# Patient Record
Sex: FEMALE | Race: WHITE | NOT HISPANIC OR LATINO | Employment: FULL TIME | URBAN - METROPOLITAN AREA
[De-identification: names, ages, dates, MRNs, and addresses within clinical notes are randomized per-mention and may not be internally consistent; named-entity substitution may affect disease eponyms.]

---

## 2017-05-09 ENCOUNTER — ALLSCRIPTS OFFICE VISIT (OUTPATIENT)
Dept: OTHER | Facility: OTHER | Age: 52
End: 2017-05-09

## 2017-05-09 DIAGNOSIS — N95.1 FEMALE CLIMACTERIC STATE: ICD-10-CM

## 2017-11-17 ENCOUNTER — GENERIC CONVERSION - ENCOUNTER (OUTPATIENT)
Dept: OTHER | Facility: OTHER | Age: 52
End: 2017-11-17

## 2018-01-12 VITALS
WEIGHT: 116.25 LBS | SYSTOLIC BLOOD PRESSURE: 118 MMHG | HEIGHT: 59 IN | BODY MASS INDEX: 23.44 KG/M2 | DIASTOLIC BLOOD PRESSURE: 72 MMHG

## 2018-01-15 NOTE — MISCELLANEOUS
Message   Date: 17 Nov 2017 10:45 AM EST, Recorded By: Jocy Mckeon For: Saurabh Schneider   Caller: Florence Randolph, Self   Phone: [phone] (Home), [phone] (Work)   Reason: Renew Medication   Patient called for refill of Prempro sent to [facility] is out of it  Escript sent for 90 day supply  Active Problems    1  Breast disorder (611 9) (N64 9)   2  Denied: History of Breast self examination education, encounter for   3  Cervical cancer screening (V76 2) (Z12 4)   4  Dense breasts (793 82) (R92 2)   5  Encounter for routine gynecological examination (V72 31) (Z01 419)   6  Encounter for screening mammogram for breast cancer (V76 12) (Z12 31)   7  Flu vaccine need (V04 81) (Z23)   8  Screening for HPV (human papillomavirus) (V73 81) (Z11 51)   9  Symptomatic menopausal or female climacteric states (627 2) (N95 1)    Current Meds   1  Prempro 0 3-1 5 MG Oral Tablet; Take 1 tablet daily; Therapy: [id number] to (Last [id number])  Requested for: [id number] Ordered    Allergies    1  No Known Drug Allergies    Plan  Symptomatic menopausal or female climacteric states    · Prempro 0 3-1 5 MG Oral Tablet;  Take 1 tablet daily    Signatures   Electronically signed by : Shukri Storm, ; Nov 17 2017 10:49AM EST                       (Author)

## 2018-01-17 NOTE — MISCELLANEOUS
Message   Date: 30 Nov 2016 8:50 AM EST, Recorded By: Beck Myles For: Fanta Mckeonstan: Kathy Simpson, Self   Phone: [phone] (Home), [phone] (Work)   Reason: Medical Complaint   pt has been having pelvic pain for 2 weeks    pt will come in for an U/S and appt with DARIA Perez Active Problems    1  Breast disorder (611 9) (N64 9)   2  Denied: History of Breast self examination education, encounter for   3  Cervical cancer screening (V76 2) (Z12 4)   4  Dense breasts (793 82) (R92 2)   5  Encounter for routine gynecological examination (V72 31) (Z01 419)   6  Screening for HPV (human papillomavirus) (V73 81) (Z11 51)   7  Symptomatic menopausal or female climacteric states (627 2) (N95 1)    Current Meds   1  Prempro 0 3-1 5 MG Oral Tablet; One po daily; Therapy: [id number] to (Last Rx:08Jun2016)  Requested for: [id number] Ordered    Allergies    1   No Known Drug Allergies    Signatures   Electronically signed by : Ward Adame, ; Nov 30 2016  8:51AM EST                       (Author)

## 2018-06-13 ENCOUNTER — ANNUAL EXAM (OUTPATIENT)
Dept: OBGYN CLINIC | Facility: CLINIC | Age: 53
End: 2018-06-13
Payer: COMMERCIAL

## 2018-06-13 VITALS
WEIGHT: 119.4 LBS | BODY MASS INDEX: 23.44 KG/M2 | HEIGHT: 60 IN | SYSTOLIC BLOOD PRESSURE: 110 MMHG | DIASTOLIC BLOOD PRESSURE: 60 MMHG

## 2018-06-13 DIAGNOSIS — Z01.419 ENCOUNTER FOR GYNECOLOGICAL EXAMINATION WITHOUT ABNORMAL FINDING: Primary | ICD-10-CM

## 2018-06-13 DIAGNOSIS — Z12.31 ENCOUNTER FOR SCREENING MAMMOGRAM FOR MALIGNANT NEOPLASM OF BREAST: ICD-10-CM

## 2018-06-13 DIAGNOSIS — N95.1 SYMPTOMATIC MENOPAUSAL OR FEMALE CLIMACTERIC STATES: ICD-10-CM

## 2018-06-13 PROCEDURE — 99396 PREV VISIT EST AGE 40-64: CPT | Performed by: OBSTETRICS & GYNECOLOGY

## 2018-06-13 RX ORDER — TRIAMCINOLONE ACETONIDE 1 MG/ML
LOTION TOPICAL
Refills: 0 | COMMUNITY
Start: 2018-04-19

## 2018-08-23 DIAGNOSIS — N95.1 SYMPTOMATIC MENOPAUSAL OR FEMALE CLIMACTERIC STATES: ICD-10-CM

## 2018-08-24 RX ORDER — ESTROGEN,CON/M-PROGEST ACET 0.3-1.5MG
TABLET ORAL
Qty: 84 TABLET | Refills: 1 | Status: SHIPPED | OUTPATIENT
Start: 2018-08-24 | End: 2019-03-06 | Stop reason: SDUPTHER

## 2019-03-06 DIAGNOSIS — N95.1 SYMPTOMATIC MENOPAUSAL OR FEMALE CLIMACTERIC STATES: ICD-10-CM

## 2019-03-07 RX ORDER — ESTROGEN,CON/M-PROGEST ACET 0.3-1.5MG
TABLET ORAL
Qty: 84 TABLET | Refills: 0 | Status: SHIPPED | OUTPATIENT
Start: 2019-03-07 | End: 2019-07-24 | Stop reason: SDUPTHER

## 2019-04-20 DIAGNOSIS — Z12.31 ENCOUNTER FOR SCREENING MAMMOGRAM FOR MALIGNANT NEOPLASM OF BREAST: ICD-10-CM

## 2019-06-12 ENCOUNTER — TELEPHONE (OUTPATIENT)
Dept: OBGYN CLINIC | Facility: CLINIC | Age: 54
End: 2019-06-12

## 2019-06-25 ENCOUNTER — ANNUAL EXAM (OUTPATIENT)
Dept: OBGYN CLINIC | Facility: CLINIC | Age: 54
End: 2019-06-25
Payer: COMMERCIAL

## 2019-06-25 VITALS
HEIGHT: 57 IN | DIASTOLIC BLOOD PRESSURE: 62 MMHG | WEIGHT: 119.8 LBS | BODY MASS INDEX: 25.85 KG/M2 | SYSTOLIC BLOOD PRESSURE: 138 MMHG

## 2019-06-25 DIAGNOSIS — Z12.4 CERVICAL CANCER SCREENING: Primary | ICD-10-CM

## 2019-06-25 DIAGNOSIS — Z01.419 ENCOUNTER FOR GYNECOLOGICAL EXAMINATION WITHOUT ABNORMAL FINDING: ICD-10-CM

## 2019-06-25 DIAGNOSIS — Z11.51 SCREENING FOR HPV (HUMAN PAPILLOMAVIRUS): ICD-10-CM

## 2019-06-25 DIAGNOSIS — Z12.31 ENCOUNTER FOR SCREENING MAMMOGRAM FOR MALIGNANT NEOPLASM OF BREAST: ICD-10-CM

## 2019-06-25 PROCEDURE — 99396 PREV VISIT EST AGE 40-64: CPT | Performed by: OBSTETRICS & GYNECOLOGY

## 2019-06-27 LAB
CLINICAL INFO: NORMAL
CYTO CVX: NORMAL
CYTOLOGY CMNT CVX/VAG CYTO-IMP: NORMAL
DATE PREVIOUS BX: NORMAL
HPV E6+E7 MRNA CVX QL NAA+PROBE: NOT DETECTED
LMP START DATE: NORMAL
SL AMB PREV. PAP:: NORMAL
SPECIMEN SOURCE CVX/VAG CYTO: NORMAL

## 2019-07-24 DIAGNOSIS — N95.1 SYMPTOMATIC MENOPAUSAL OR FEMALE CLIMACTERIC STATES: ICD-10-CM

## 2019-07-25 DIAGNOSIS — N95.1 SYMPTOMATIC MENOPAUSAL OR FEMALE CLIMACTERIC STATES: ICD-10-CM

## 2019-07-25 RX ORDER — ESTROGEN,CON/M-PROGEST ACET 0.3-1.5MG
TABLET ORAL
Qty: 84 TABLET | Refills: 0 | Status: SHIPPED | OUTPATIENT
Start: 2019-07-25 | End: 2019-07-25 | Stop reason: SDUPTHER

## 2019-07-26 RX ORDER — ESTROGEN,CON/M-PROGEST ACET 0.3-1.5MG
TABLET ORAL
Qty: 84 TABLET | Refills: 0 | Status: SHIPPED | OUTPATIENT
Start: 2019-07-26 | End: 2020-07-07

## 2020-05-05 DIAGNOSIS — N95.1 SYMPTOMATIC MENOPAUSAL OR FEMALE CLIMACTERIC STATES: ICD-10-CM

## 2020-05-08 DIAGNOSIS — N95.1 SYMPTOMATIC MENOPAUSAL OR FEMALE CLIMACTERIC STATES: ICD-10-CM

## 2020-05-14 DIAGNOSIS — N95.1 SYMPTOMATIC MENOPAUSAL OR FEMALE CLIMACTERIC STATES: ICD-10-CM

## 2020-06-11 RX ORDER — ESTROGEN,CON/M-PROGEST ACET 0.3-1.5MG
TABLET ORAL
Qty: 84 TABLET | Refills: 0 | OUTPATIENT
Start: 2020-06-11

## 2020-06-15 DIAGNOSIS — N95.1 SYMPTOMATIC MENOPAUSAL OR FEMALE CLIMACTERIC STATES: ICD-10-CM

## 2020-07-07 ENCOUNTER — ANNUAL EXAM (OUTPATIENT)
Dept: OBGYN CLINIC | Facility: CLINIC | Age: 55
End: 2020-07-07
Payer: COMMERCIAL

## 2020-07-07 VITALS
BODY MASS INDEX: 25.94 KG/M2 | HEIGHT: 57 IN | DIASTOLIC BLOOD PRESSURE: 86 MMHG | SYSTOLIC BLOOD PRESSURE: 156 MMHG | WEIGHT: 120.25 LBS

## 2020-07-07 DIAGNOSIS — Z12.31 ENCOUNTER FOR SCREENING MAMMOGRAM FOR MALIGNANT NEOPLASM OF BREAST: ICD-10-CM

## 2020-07-07 DIAGNOSIS — Z01.419 ENCOUNTER FOR GYNECOLOGICAL EXAMINATION WITHOUT ABNORMAL FINDING: Primary | ICD-10-CM

## 2020-07-07 DIAGNOSIS — N95.1 SYMPTOMATIC MENOPAUSAL OR FEMALE CLIMACTERIC STATES: ICD-10-CM

## 2020-07-07 PROCEDURE — 99396 PREV VISIT EST AGE 40-64: CPT | Performed by: OBSTETRICS & GYNECOLOGY

## 2020-07-07 NOTE — PROGRESS NOTES
CC:  Annual exam    HPI: Amanda Garcia presents for routine gyn exam   The lease is doing well with no complaints or concerns  She did need to restart the Prempro 0 3/1 5 mg for heat flashes  She did try to taper off but sound that this was unsatisfactory to her  She has been on this for several years and we did discuss the pros and cons of this at length  I will have her restart but urged her to try to take the medication every other day  Past Medical History:  Past Medical History:   Diagnosis Date    Abnormal Pap smear of cervix     HPV (human papilloma virus) infection     Shingles     last assessed 10/27/14       Past Surgical History:  Past Surgical History:   Procedure Laterality Date    COLPOSCOPY W/ BIOPSY / CURETTAGE  11/22/1995    COLPOSCOPY W/ BIOPSY / CURETTAGE  03/07/2003    SALPINGOOPHORECTOMY Right        Past OB/Gyn History:    Patient is menopausal   Denies any history of sexually transmitted infection  No history of abnormal pap smears   Her last pap smear was last urine was normal     ALLERGIES: No Known Allergies    MEDS:   Current Outpatient Medications:     estrogen, conjugated,-medroxyprogesterone (Prempro) 0 3-1 5 MG per tablet    triamcinolone (KENALOG) 0 1 % lotion    Family History:  Family History   Problem Relation Age of Onset    Diabetes Mother     Hyperlipidemia Mother     Diabetes Father     Hyperlipidemia Father        Social History:  Social History     Socioeconomic History    Marital status: /Civil Union     Spouse name: Not on file    Number of children: 2    Years of education: Not on file    Highest education level: Not on file   Occupational History    Not on file   Social Needs    Financial resource strain: Not on file    Food insecurity:     Worry: Not on file     Inability: Not on file    Transportation needs:     Medical: Not on file     Non-medical: Not on file   Tobacco Use    Smoking status: Never Smoker    Smokeless tobacco: Never Used   Substance and Sexual Activity    Alcohol use: No    Drug use: No    Sexual activity: Not Currently     Partners: Male     Birth control/protection: Post-menopausal   Lifestyle    Physical activity:     Days per week: Not on file     Minutes per session: Not on file    Stress: Not on file   Relationships    Social connections:     Talks on phone: Not on file     Gets together: Not on file     Attends Rastafari service: Not on file     Active member of club or organization: Not on file     Attends meetings of clubs or organizations: Not on file     Relationship status: Not on file    Intimate partner violence:     Fear of current or ex partner: Not on file     Emotionally abused: Not on file     Physically abused: Not on file     Forced sexual activity: Not on file   Other Topics Concern    Not on file   Social History Narrative    Caffeine use    Spiritism affiliation Latter day    Uses safety equipment: seatbelts             Review of Systems:  Gen:   Denies fatigue, chills, nausea, vomiting, fever  Skin: No rashes or discolorations of any concern  RESP: Denies SOB, no cough  CV: Denies chest pain or palpitations  Breasts: Denies masses, pain, skin changes and nipple discharge  GI: Denies abdominal pain, heartburn, nausea, vomiting, changes in bowel habits  : Denies dysuria, frequency, CVA tenderness, incontinence and hematuria  Genitalia: Denies abnormal vaginal discharge, external lesions, rashes, pelvic pain, pressure, abnormal bleeding  Rectal:  Denies pain, bleeding, hemorrhoids,    Physical Exam:  /86 (BP Location: Right arm, Patient Position: Sitting, Cuff Size: Standard)   Ht 4' 8 5" (1 435 m)   Wt 54 5 kg (120 lb 4 oz)   BMI 26 48 kg/m²    Gen: The patient was alert and oriented x3, pleasant well-appearing female in no acute distress  Neck:  Unremarkable, no lymphadenopathy, no thyromegaly, or tenderness    CV:  RRR, no murmurs  Resp:  Clear to auscultation bilaterally, no wheezing  Breasts: Symmetric  No dominant, discrete, fixed  or suspicious masses are noted  No skin or nipple changes  No palpable axillary nodes  No supraclavicular adenopathy  Abd:  Soft, nontender, nondistended, no masses or organomegaly  Back:  No CVA tenderness, no tenderness to palpation along spine  Pelvic:  Normal appearing external female genitalia, no visible lesions, no rashes  Vagina is free of discharge, normal vaginal epithelium, no abnormal  lesions, no evidence of prolapse anteriorly or posteriorly  Normal appearing cervix, mobile and nontender  A thin prep pap smear was not obtained  Uterus is normal size, mobile and, nontender  No palpable adnexal masses or tenderness  No anoperineal lesions  Rectal:  No masses, tenderness, hemorrhoids, or obvious blood  Skin:  No concerning lesions  Extremeties: No edema      Assessment & Plan:   1  Routine annual exam      RTO one year orPRN  2  Encounter for screening mammogram, referral for mammogram given to patient  3   Menopausal symptoms  Patient to use low-dose Prempro, 0 3/1 5, on an every-other-day basis

## 2020-09-17 DIAGNOSIS — Z12.31 ENCOUNTER FOR SCREENING MAMMOGRAM FOR MALIGNANT NEOPLASM OF BREAST: ICD-10-CM

## 2020-09-18 DIAGNOSIS — R92.8 MAMMOGRAM ABNORMAL: Primary | ICD-10-CM

## 2020-09-25 DIAGNOSIS — R92.8 MAMMOGRAM ABNORMAL: Primary | ICD-10-CM

## 2021-04-14 DIAGNOSIS — R92.8 MAMMOGRAM ABNORMAL: ICD-10-CM

## 2021-04-15 DIAGNOSIS — R92.8 MAMMOGRAM ABNORMAL: Primary | ICD-10-CM

## 2021-07-08 ENCOUNTER — ANNUAL EXAM (OUTPATIENT)
Dept: OBGYN CLINIC | Facility: CLINIC | Age: 56
End: 2021-07-08
Payer: COMMERCIAL

## 2021-07-08 VITALS
HEIGHT: 57 IN | DIASTOLIC BLOOD PRESSURE: 82 MMHG | BODY MASS INDEX: 26.1 KG/M2 | WEIGHT: 121 LBS | SYSTOLIC BLOOD PRESSURE: 120 MMHG

## 2021-07-08 DIAGNOSIS — Z01.419 ENCOUNTER FOR GYNECOLOGICAL EXAMINATION WITHOUT ABNORMAL FINDING: Primary | ICD-10-CM

## 2021-07-08 DIAGNOSIS — N95.1 SYMPTOMATIC MENOPAUSAL OR FEMALE CLIMACTERIC STATES: ICD-10-CM

## 2021-07-08 PROCEDURE — 99396 PREV VISIT EST AGE 40-64: CPT | Performed by: OBSTETRICS & GYNECOLOGY

## 2021-07-08 NOTE — PROGRESS NOTES
CC:  Annual exam    HPI: Thelma Fuentes presents for routine gyn exam   Enrike Padilla is doing well and expresses no complaints or concerns at this time  She does have a follow-up mammogram scheduled for October but otherwise has no other over riding issues  Past Medical History:  Past Medical History:   Diagnosis Date    Abnormal Pap smear of cervix     HPV (human papilloma virus) infection     Shingles     last assessed 10/27/14       Past Surgical History:  Past Surgical History:   Procedure Laterality Date    COLPOSCOPY W/ BIOPSY / CURETTAGE  11/22/1995    COLPOSCOPY W/ BIOPSY / CURETTAGE  03/07/2003    SALPINGOOPHORECTOMY Right        Past OB/Gyn History:    Patient is menopausal   Denies any history of sexually transmitted infection  No history of abnormal pap smears   Her last pap smear was  2019 and normal     ALLERGIES: No Known Allergies    MEDS:   Current Outpatient Medications:     estrogen, conjugated,-medroxyprogesterone (Prempro) 0 3-1 5 MG per tablet    triamcinolone (KENALOG) 0 1 % lotion    Family History:  Family History   Problem Relation Age of Onset    Diabetes Mother     Hyperlipidemia Mother     Diabetes Father     Hyperlipidemia Father        Social History:  Social History     Socioeconomic History    Marital status: /Civil Union     Spouse name: Not on file    Number of children: 2    Years of education: Not on file    Highest education level: Not on file   Occupational History    Not on file   Tobacco Use    Smoking status: Never Smoker    Smokeless tobacco: Never Used   Vaping Use    Vaping Use: Never used   Substance and Sexual Activity    Alcohol use: No    Drug use: No    Sexual activity: Not Currently     Partners: Male     Birth control/protection: Post-menopausal   Other Topics Concern    Not on file   Social History Narrative    Caffeine use    Christianity affiliation Latter-day    Uses safety equipment: seatbelts         Social Determinants of Health Financial Resource Strain:     Difficulty of Paying Living Expenses:    Food Insecurity:     Worried About Running Out of Food in the Last Year:     [address] in the Last Year:    Transportation Needs:     Lack of Transportation (Medical):  Lack of Transportation (Non-Medical):    Physical Activity:     Days of Exercise per Week:     Minutes of Exercise per Session:    Stress:     Feeling of Stress :    Social Connections:     Frequency of Communication with Friends and Family:     Frequency of Social Gatherings with Friends and Family:     Attends Episcopal Services:     Active Member of Clubs or Organizations:     Attends Club or Organization Meetings:     Marital Status:    Intimate Partner Violence:     Fear of Current or Ex-Partner:     Emotionally Abused:     Physically Abused:     Sexually Abused:          Review of Systems:  Gen:   Denies fatigue, chills, nausea, vomiting, fever  Skin: No rashes or discolorations of any concern  RESP: Denies SOB, no cough  CV: Denies chest pain or palpitations  Breasts: Denies masses, pain, skin changes and nipple discharge  GI: Denies abdominal pain, heartburn, nausea, vomiting, changes in bowel habits  : Denies dysuria, frequency, CVA tenderness, incontinence and hematuria  Genitalia: Denies abnormal vaginal discharge, external lesions, rashes, pelvic pain, pressure, abnormal bleeding  Rectal:  Denies pain, bleeding, hemorrhoids,    Physical Exam:  /82 (BP Location: Left arm, Patient Position: Sitting, Cuff Size: Standard)   Ht 4' 8 5" (1 435 m)   Wt 54 9 kg (121 lb)   BMI 26 65 kg/m²    Gen: The patient was alert and oriented x3, pleasant well-appearing female in no acute distress  Neck:  Unremarkable, no lymphadenopathy, no thyromegaly, or tenderness  CV:  RRR, no murmurs  Resp:  Clear to auscultation bilaterally, no wheezing  Breasts: Symmetric  No dominant, discrete, fixed  or suspicious masses are noted     No skin or nipple changes  No palpable axillary nodes  No supraclavicular adenopathy  Abd:  Soft, nontender, nondistended, no masses or organomegaly  Back:  No CVA tenderness, no tenderness to palpation along spine  Pelvic:  Normal appearing external female genitalia, no visible lesions, no rashes  Vagina is free of discharge, normal vaginal epithelium, no abnormal  lesions, no evidence of prolapse anteriorly or posteriorly  Normal appearing cervix, mobile and nontender  A thin prep pap smear was not obtained  Uterus is normal size, mobile and, nontender  No palpable adnexal masses or tenderness  No anoperineal lesions  Rectal:  No masses, tenderness, hemorrhoids, or obvious blood  Skin:  No concerning lesions  Extremeties: No edema      Assessment & Plan:   1  Routine annual exam   No issues or problems  RTO one year orPRN  2    Mammogram to be repeated this October, referral in the computer  3   Menopausal symptoms which are managed through low-dose Prempro 0 3/1 5

## 2021-07-31 DIAGNOSIS — N95.1 SYMPTOMATIC MENOPAUSAL OR FEMALE CLIMACTERIC STATES: ICD-10-CM

## 2021-08-02 RX ORDER — ESTROGEN,CON/M-PROGEST ACET 0.3-1.5MG
TABLET ORAL
Qty: 84 TABLET | Refills: 2 | Status: SHIPPED | OUTPATIENT
Start: 2021-08-02

## 2021-10-26 ENCOUNTER — TELEPHONE (OUTPATIENT)
Dept: OBGYN CLINIC | Facility: CLINIC | Age: 56
End: 2021-10-26

## 2024-01-28 ENCOUNTER — OFFICE VISIT (OUTPATIENT)
Age: 59
End: 2024-01-28
Payer: COMMERCIAL

## 2024-01-28 VITALS
WEIGHT: 120 LBS | HEIGHT: 60 IN | TEMPERATURE: 98.6 F | BODY MASS INDEX: 23.56 KG/M2 | DIASTOLIC BLOOD PRESSURE: 74 MMHG | RESPIRATION RATE: 18 BRPM | OXYGEN SATURATION: 96 % | SYSTOLIC BLOOD PRESSURE: 134 MMHG | HEART RATE: 85 BPM

## 2024-01-28 DIAGNOSIS — J01.90 ACUTE BACTERIAL SINUSITIS: Primary | ICD-10-CM

## 2024-01-28 DIAGNOSIS — B96.89 ACUTE BACTERIAL SINUSITIS: Primary | ICD-10-CM

## 2024-01-28 PROCEDURE — 99213 OFFICE O/P EST LOW 20 MIN: CPT

## 2024-01-28 RX ORDER — AMOXICILLIN 875 MG/1
875 TABLET, COATED ORAL 2 TIMES DAILY
Qty: 14 TABLET | Refills: 0 | Status: SHIPPED | OUTPATIENT
Start: 2024-01-28 | End: 2024-02-04

## 2024-01-28 RX ORDER — PREDNISONE 20 MG/1
20 TABLET ORAL 2 TIMES DAILY
Qty: 10 TABLET | Refills: 0 | Status: SHIPPED | OUTPATIENT
Start: 2024-01-28 | End: 2024-02-02

## 2024-01-28 RX ORDER — BENZONATATE 100 MG/1
100 CAPSULE ORAL 3 TIMES DAILY PRN
Qty: 20 CAPSULE | Refills: 0 | Status: SHIPPED | OUTPATIENT
Start: 2024-01-28

## 2024-01-28 NOTE — PATIENT INSTRUCTIONS
Take antibiotics as prescribed  For decongestion, Over The Counter medications:  Nasal corticosteroid: examples are Flonase or Nasacort.  Nasal saline irrigation  Humidified air  Warm moist air such as running the shower for several minutes on warm/hot for the steam. Sit in bathroom for several minutes to take deep breaths.  Vicks Vapor Rub  For Cough or sore throat:  Salt water gurgle  Honey  Chloraseptic spray  Throat lozenges  Over the Counter Tylenol  Dextromethorphan 30mg PO every 6-8 hours for cough. max 120 mg in 24 hour period or Tessalon that was prescribed.  Follow up with Primary Care Provider in 3-5 days if not improving.  Proceed to ER if symptoms worsen.

## 2024-01-28 NOTE — PROGRESS NOTES
[facility]        NAME: Shree Hollingsworth is a 58 y.o. female  : 1965    MRN: [id number]  DATE: 2024  TIME: 10:04 AM    Assessment and Plan   Acute bacterial sinusitis [J01.90, B96.89]  1. Acute bacterial sinusitis  amoxicillin (AMOXIL) 875 mg tablet    benzonatate (TESSALON PERLES) 100 mg capsule    predniSONE 20 mg tablet            Patient Instructions   Take antibiotics as prescribed  For decongestion, Over The Counter medications:  Nasal corticosteroid: examples are Flonase or Nasacort.  Nasal saline irrigation  Humidified air  Warm moist air such as running the shower for several minutes on warm/hot for the steam. Sit in bathroom for several minutes to take deep breaths.  Vicks Vapor Rub  For Cough or sore throat:  Salt water gurgle  Honey  Chloraseptic spray  Throat lozenges  Over the Counter Tylenol  Dextromethorphan 30mg PO every 6-8 hours for cough. max 120 mg in 24 hour period or Tessalon that was prescribed.  Follow up with Primary Care Provider in 3-5 days if not improving.  Proceed to ER if symptoms worsen.        Chief Complaint     Chief Complaint   Patient presents with   • Cough     Sick since Wednesday evening while on vacation in [place]. Cough is productive with yellow-brown mucus. Green mucus from sinuses. Ears are burning, fever intermittently. Taking OTC cold medication. She noticed when she lies supine, there is a wheezing in chest, she started to sleep more upright which helps.          History of Present Illness       Sinus congestion and cough starting about 5 days ago. States the symptoms have been consistent but does report purulent drainage during this time. Has been taking OTC medication without improvement. Was recently in [place] when this had started.    Cough  This is a new problem. The current episode started in the past 7 days. The problem has been unchanged. The problem occurs every few minutes. The cough is Productive of brown sputum. Associated symptoms  include chills, ear congestion, ear pain, a fever, myalgias, nasal congestion, postnasal drip, a sore throat, sweats and wheezing. Pertinent negatives include no chest pain, headaches, heartburn, hemoptysis, rash, rhinorrhea, shortness of breath or weight loss. The symptoms are aggravated by lying down.       Review of Systems   Review of Systems   Constitutional:  Positive for chills and fever. Negative for weight loss.   HENT:  Positive for ear pain, postnasal drip and sore throat. Negative for rhinorrhea.    Respiratory:  Positive for cough and wheezing. Negative for hemoptysis and shortness of breath.    Cardiovascular:  Negative for chest pain.   Gastrointestinal:  Negative for heartburn.   Musculoskeletal:  Positive for myalgias.   Skin:  Negative for rash.   Neurological:  Negative for dizziness, weakness and headaches.         Current Medications       Current Outpatient Medications:   •  amoxicillin (AMOXIL) 875 mg tablet, Take 1 tablet (875 mg total) by mouth 2 (two) times a day for 7 days, Disp: 14 tablet, Rfl: 0  •  benzonatate (TESSALON PERLES) 100 mg capsule, Take 1 capsule (100 mg total) by mouth 3 (three) times a day as needed for cough, Disp: 20 capsule, Rfl: 0  •  predniSONE 20 mg tablet, Take 1 tablet (20 mg total) by mouth 2 (two) times a day for 5 days, Disp: 10 tablet, Rfl: 0  •  Prempro 0.3-1.5 MG per tablet, TAKE 1 TABLET BY MOUTH EVERY DAY, Disp: 84 tablet, Rfl: 2  •  triamcinolone (KENALOG) 0.1 % lotion, APPLY AND MASSAGE INTO SCALP DAILY (Patient not taking: Reported on 1/28/2024), Disp: , Rfl: 0    Current Allergies     Allergies as of 01/28/2024   • (No Known Allergies)            The following portions of the patient's history were reviewed and updated as appropriate: allergies, current medications, past family history, past medical history, past social history, past surgical history and problem list.     Past Medical History:   Diagnosis Date   • Abnormal Pap smear of cervix    • HPV  (human papilloma virus) infection    • Shingles     last assessed 10/27/14       Past Surgical History:   Procedure Laterality Date   • COLPOSCOPY W/ BIOPSY / CURETTAGE  11/22/1995   • COLPOSCOPY W/ BIOPSY / CURETTAGE  03/07/2003   • SALPINGOOPHORECTOMY Right        Family History   Problem Relation Age of Onset   • Diabetes Mother    • Hyperlipidemia Mother    • Diabetes Father    • Hyperlipidemia Father          Medications have been verified.        Objective   /74   Pulse 85   Temp 98.6 °F (37 °C)   Resp 18   Ht 5' (1.524 m)   Wt 54.4 kg (120 lb)   SpO2 96%   BMI 23.44 kg/m²   No LMP recorded. Patient is postmenopausal.       Physical Exam     Physical Exam  Vitals and nursing note reviewed.   Constitutional:       Appearance: Normal appearance.   HENT:      Head: Normocephalic and atraumatic.      Right Ear: There is impacted cerumen.      Left Ear: Tympanic membrane normal.      Nose: Congestion and rhinorrhea present.      Right Sinus: Maxillary sinus tenderness present.      Left Sinus: Maxillary sinus tenderness present.      Mouth/Throat:      Mouth: Mucous membranes are moist.   Eyes:      Pupils: Pupils are equal, round, and reactive to light.   Pulmonary:      Effort: Pulmonary effort is normal.      Breath sounds: Rales present.   Skin:     General: Skin is warm and dry.      Capillary Refill: Capillary refill takes less than 2 seconds.   Neurological:      General: No focal deficit present.      Mental Status: She is alert and oriented to person, place, and time. Mental status is at baseline.      Sensory: No sensory deficit.      Motor: No weakness.   Psychiatric:         Mood and Affect: Mood normal.         Behavior: Behavior normal.         Thought Content: Thought content normal.
